# Patient Record
Sex: FEMALE | Race: WHITE | NOT HISPANIC OR LATINO | Employment: FULL TIME | URBAN - METROPOLITAN AREA
[De-identification: names, ages, dates, MRNs, and addresses within clinical notes are randomized per-mention and may not be internally consistent; named-entity substitution may affect disease eponyms.]

---

## 2024-08-19 ENCOUNTER — OFFICE VISIT (OUTPATIENT)
Dept: URGENT CARE | Facility: CLINIC | Age: 59
End: 2024-08-19
Payer: COMMERCIAL

## 2024-08-19 ENCOUNTER — APPOINTMENT (OUTPATIENT)
Dept: URGENT CARE | Facility: CLINIC | Age: 59
End: 2024-08-19

## 2024-08-19 VITALS
OXYGEN SATURATION: 97 % | HEART RATE: 64 BPM | TEMPERATURE: 97.8 F | DIASTOLIC BLOOD PRESSURE: 84 MMHG | SYSTOLIC BLOOD PRESSURE: 126 MMHG | WEIGHT: 180 LBS

## 2024-08-19 DIAGNOSIS — H65.01 NON-RECURRENT ACUTE SEROUS OTITIS MEDIA OF RIGHT EAR: Primary | ICD-10-CM

## 2024-08-19 PROCEDURE — 99203 OFFICE O/P NEW LOW 30 MIN: CPT | Performed by: PHYSICIAN ASSISTANT

## 2024-08-19 RX ORDER — METRONIDAZOLE 500 MG/1
TABLET ORAL
COMMUNITY
Start: 2023-10-10 | End: 2024-08-19 | Stop reason: WASHOUT

## 2024-08-19 RX ORDER — PAROXETINE 30 MG/1
1 TABLET, FILM COATED ORAL
COMMUNITY
Start: 2024-07-15

## 2024-08-19 RX ORDER — AMOXICILLIN AND CLAVULANATE POTASSIUM 875; 125 MG/1; MG/1
1 TABLET, FILM COATED ORAL 2 TIMES DAILY
Qty: 20 TABLET | Refills: 0 | Status: SHIPPED | OUTPATIENT
Start: 2024-08-19 | End: 2024-08-29

## 2024-08-19 RX ORDER — IBUPROFEN 800 MG/1
1 TABLET ORAL EVERY 8 HOURS PRN
COMMUNITY
Start: 2023-10-16 | End: 2024-08-19 | Stop reason: WASHOUT

## 2024-08-19 RX ORDER — PAROXETINE HYDROCHLORIDE 20 MG/1
1 TABLET, FILM COATED ORAL
COMMUNITY
Start: 2023-12-14 | End: 2024-08-19 | Stop reason: WASHOUT

## 2024-08-19 RX ORDER — AMOXICILLIN 500 MG/1
CAPSULE ORAL
COMMUNITY
Start: 2023-10-16 | End: 2024-08-19 | Stop reason: WASHOUT

## 2024-08-19 RX ORDER — METHYLPREDNISOLONE 4 MG/1
TABLET ORAL
COMMUNITY
Start: 2023-10-16 | End: 2024-08-19 | Stop reason: WASHOUT

## 2024-08-19 NOTE — LETTER
August 19, 2024     Patient: Eva Rust   YOB: 1965   Date of Visit: 8/19/2024       To Whom It May Concern:    Eva Rust was seen in my clinic on 8/19/2024 at 9:20 am. Please excuse Eva for her absence from work on this day to make the appointment.    If you have any questions or concerns, please don't hesitate to call.         Sincerely,         Diya Lea PA-C        CC: No Recipients

## 2024-08-19 NOTE — PROGRESS NOTES
"Subjective   Patient ID: Eva Rust is a 58 y.o. female.    Patient presents with R ear pain, muffled hearing, pain, yellow drainage. Also reports nausea and dizziness. Pt is a current smoker. Denies headache, CP, SOB, abdominal pain, V/D, rash, swelling, bruising, L ear pain, sore throat, cough, congestion. She has not had ear infections in \"years.\" She has been using Benadryl and Tylenol without relief.       History provided by:  Patient  Earache   There is pain in the right ear. This is a new problem. The current episode started yesterday. The problem occurs constantly. The problem has been unchanged. There has been no fever.     Review of Systems   HENT:  Positive for ear pain.    All other systems reviewed and are negative.    Objective     Physical Exam  Vitals reviewed.   Constitutional:       General: She is awake.      Appearance: Normal appearance. She is well-developed.   HENT:      Head: Normocephalic and atraumatic.      Right Ear: Ear canal normal. A middle ear effusion is present. Tympanic membrane is erythematous and bulging.      Left Ear: Tympanic membrane and ear canal normal.      Mouth/Throat:      Lips: Pink.      Mouth: Mucous membranes are moist.      Pharynx: Oropharynx is clear.   Cardiovascular:      Rate and Rhythm: Normal rate.   Pulmonary:      Effort: Pulmonary effort is normal.   Musculoskeletal:      Cervical back: Full passive range of motion without pain.      Right lower leg: No edema.      Left lower leg: No edema.   Skin:     General: Skin is warm and dry.      Findings: No lesion or rash.   Neurological:      General: No focal deficit present.      Mental Status: She is alert and oriented to person, place, and time.      Cranial Nerves: No facial asymmetry.      Motor: Motor function is intact.      Gait: Gait is intact.   Psychiatric:         Attention and Perception: Attention normal.         Mood and Affect: Mood and affect normal.         Assessment/Plan   Diagnoses and " all orders for this visit:  Non-recurrent acute serous otitis media of right ear  -     amoxicillin-pot clavulanate (Augmentin) 875-125 mg tablet; Take 1 tablet by mouth 2 times a day for 10 days.    No evidence of outer ear infection/no active drainage today   Augmentin Rx   Can use antihistamines, decongestants. Discussed medrol dosepak, pt declined at this time.   Can use IBU or naproxen for additional pain relief.     Red flag symptoms reviewed with patient and all questions answered. Patient or parent/guardian verbalized understanding and agreement with care plan as above. All in office testing reviewed with patient. If symptoms worsen or do not improve, patient is to follow up with PCP or report to the ER.

## 2024-12-23 ENCOUNTER — APPOINTMENT (OUTPATIENT)
Dept: URGENT CARE | Age: 59
End: 2024-12-23
Payer: COMMERCIAL

## 2024-12-24 ENCOUNTER — OFFICE VISIT (OUTPATIENT)
Dept: URGENT CARE | Age: 59
End: 2024-12-24
Payer: COMMERCIAL

## 2024-12-24 VITALS
TEMPERATURE: 97.7 F | DIASTOLIC BLOOD PRESSURE: 90 MMHG | HEART RATE: 68 BPM | RESPIRATION RATE: 18 BRPM | SYSTOLIC BLOOD PRESSURE: 157 MMHG | OXYGEN SATURATION: 98 %

## 2024-12-24 DIAGNOSIS — H60.501 ACUTE OTITIS EXTERNA OF RIGHT EAR, UNSPECIFIED TYPE: Primary | ICD-10-CM

## 2024-12-24 PROCEDURE — 99203 OFFICE O/P NEW LOW 30 MIN: CPT

## 2024-12-24 RX ORDER — CIPROFLOXACIN AND DEXAMETHASONE 3; 1 MG/ML; MG/ML
4 SUSPENSION/ DROPS AURICULAR (OTIC) 2 TIMES DAILY
Qty: 7.5 ML | Refills: 0 | Status: SHIPPED | OUTPATIENT
Start: 2024-12-24 | End: 2024-12-31

## 2024-12-24 ASSESSMENT — ENCOUNTER SYMPTOMS
FEVER: 0
TROUBLE SWALLOWING: 0
CONSTIPATION: 0
WHEEZING: 0
CHILLS: 0
PALPITATIONS: 0
NAUSEA: 0
DIARRHEA: 0
SORE THROAT: 0
CHEST TIGHTNESS: 0
SHORTNESS OF BREATH: 0
RHINORRHEA: 0
COUGH: 0

## 2024-12-24 NOTE — PATIENT INSTRUCTIONS
Avoid using airpods    Tylenol and ibuprofen  Take 500-1000 mg of tylenol every 6 hours  And ibuprofen 400-600 mg every 6 hours  Alternate the two medication ever 3 hours

## 2024-12-24 NOTE — PROGRESS NOTES
Subjective   Patient ID: Eva Rust is a 59 y.o. female. They present today with a chief complaint of Earache (Pt advised that she has had an right earache for the past 3-4 days. ).    History of Present Illness  Patient presents with 3-4 days of right ear ache. Denies uri symptoms including congestion, cough. Denies fever, chills, sweats. Denies n/v/d. Denies chest pain, sob.   Denies discharge or drainage from ear. Wears airpods.       Earache   Pertinent negatives include no coughing, diarrhea, ear discharge, rash, rhinorrhea or sore throat.       Past Medical History  Allergies as of 12/24/2024    (No Known Allergies)       (Not in a hospital admission)       Past Medical History:   Diagnosis Date    Other specified health status     No pertinent past medical history       No past surgical history on file.         Review of Systems  Review of Systems   Constitutional:  Negative for chills and fever.   HENT:  Positive for ear pain. Negative for congestion, ear discharge, postnasal drip, rhinorrhea, sore throat and trouble swallowing.    Respiratory:  Negative for cough, chest tightness, shortness of breath and wheezing.    Cardiovascular:  Negative for chest pain and palpitations.   Gastrointestinal:  Negative for constipation, diarrhea and nausea.   Skin:  Negative for rash.                                  Objective    Vitals:    12/24/24 0839   BP: 157/90   Pulse: 68   Resp: 18   Temp: 36.5 °C (97.7 °F)   SpO2: 98%     No LMP recorded.    Physical Exam  Constitutional:       General: She is not in acute distress.     Appearance: She is not toxic-appearing.   HENT:      Right Ear: Tympanic membrane and external ear normal. Tenderness present. Tympanic membrane is not erythematous or bulging.      Left Ear: Tympanic membrane and external ear normal.      Ears:      Comments: Swelling, and exudate noted to right external canal. Tragal tenderness positive.      Nose: No congestion.      Right Sinus: No frontal  sinus tenderness.      Left Sinus: No frontal sinus tenderness.      Mouth/Throat:      Mouth: Mucous membranes are moist. No oral lesions.      Pharynx: No oropharyngeal exudate, posterior oropharyngeal erythema or postnasal drip.   Eyes:      Pupils: Pupils are equal, round, and reactive to light.   Cardiovascular:      Rate and Rhythm: Normal rate and regular rhythm.   Pulmonary:      Effort: Pulmonary effort is normal. No respiratory distress.      Breath sounds: Normal breath sounds. No wheezing.   Musculoskeletal:      Cervical back: Normal range of motion.   Neurological:      Mental Status: She is alert.         Procedures    Point of Care Test & Imaging Results from this visit  No results found for this visit on 12/24/24.   No results found.    Diagnostic study results (if any) were reviewed by Lanette Weber PA-C.    Assessment/Plan   Allergies, medications, history, and pertinent labs/EKGs/Imaging reviewed by Lanette Weber PA-C.     Medical Decision Making  MDM- signs and symptoms consistent with acute otitis externa. No evidence of AOM. No evidence of mastoiditis. Patient given antibiotic drops. Patient advised to return to clinic or present to ED if symptoms change or worsen. Otherwise follow-up with PCP. Patient verbalized understanding and agrees with plan.       Orders and Diagnoses  Diagnoses and all orders for this visit:  Acute otitis externa of right ear, unspecified type  -     ciprofloxacin-dexamethasone (Ciprodex) otic suspension; Administer 4 drops into the right ear 2 times a day for 7 days.      Medical Admin Record      Patient disposition: Home    Electronically signed by Lanette Weber PA-C  8:49 AM